# Patient Record
Sex: FEMALE | Race: WHITE | Employment: OTHER | URBAN - METROPOLITAN AREA
[De-identification: names, ages, dates, MRNs, and addresses within clinical notes are randomized per-mention and may not be internally consistent; named-entity substitution may affect disease eponyms.]

---

## 2017-11-28 ENCOUNTER — TRANSFERRED RECORDS (OUTPATIENT)
Dept: HEALTH INFORMATION MANAGEMENT | Facility: CLINIC | Age: 47
End: 2017-11-28

## 2018-07-18 LAB
HPV ABSTRACT: NORMAL
PAP-ABSTRACT: NORMAL

## 2018-07-23 ENCOUNTER — TRANSFERRED RECORDS (OUTPATIENT)
Dept: HEALTH INFORMATION MANAGEMENT | Facility: CLINIC | Age: 48
End: 2018-07-23

## 2018-07-23 LAB — HBA1C MFR BLD: 5.3 % (ref 4.2–5.6)

## 2018-07-25 ENCOUNTER — TRANSFERRED RECORDS (OUTPATIENT)
Dept: HEALTH INFORMATION MANAGEMENT | Facility: CLINIC | Age: 48
End: 2018-07-25

## 2018-09-10 ENCOUNTER — OFFICE VISIT (OUTPATIENT)
Dept: OBGYN | Facility: CLINIC | Age: 48
End: 2018-09-10
Payer: COMMERCIAL

## 2018-09-10 VITALS
HEIGHT: 69 IN | BODY MASS INDEX: 27.4 KG/M2 | HEART RATE: 70 BPM | WEIGHT: 185 LBS | DIASTOLIC BLOOD PRESSURE: 56 MMHG | SYSTOLIC BLOOD PRESSURE: 104 MMHG

## 2018-09-10 DIAGNOSIS — N81.11 CYSTOCELE, MIDLINE: ICD-10-CM

## 2018-09-10 DIAGNOSIS — N80.129 ENDOMETRIOMA OF OVARY: ICD-10-CM

## 2018-09-10 DIAGNOSIS — D21.9 MYOMA: Primary | ICD-10-CM

## 2018-09-10 PROCEDURE — 99204 OFFICE O/P NEW MOD 45 MIN: CPT | Performed by: OBSTETRICS & GYNECOLOGY

## 2018-09-10 ASSESSMENT — ANXIETY QUESTIONNAIRES
2. NOT BEING ABLE TO STOP OR CONTROL WORRYING: NOT AT ALL
5. BEING SO RESTLESS THAT IT IS HARD TO SIT STILL: NOT AT ALL
IF YOU CHECKED OFF ANY PROBLEMS ON THIS QUESTIONNAIRE, HOW DIFFICULT HAVE THESE PROBLEMS MADE IT FOR YOU TO DO YOUR WORK, TAKE CARE OF THINGS AT HOME, OR GET ALONG WITH OTHER PEOPLE: NOT DIFFICULT AT ALL
7. FEELING AFRAID AS IF SOMETHING AWFUL MIGHT HAPPEN: NOT AT ALL
6. BECOMING EASILY ANNOYED OR IRRITABLE: NOT AT ALL
3. WORRYING TOO MUCH ABOUT DIFFERENT THINGS: NOT AT ALL
1. FEELING NERVOUS, ANXIOUS, OR ON EDGE: NOT AT ALL
GAD7 TOTAL SCORE: 0

## 2018-09-10 ASSESSMENT — PATIENT HEALTH QUESTIONNAIRE - PHQ9: 5. POOR APPETITE OR OVEREATING: NOT AT ALL

## 2018-09-10 NOTE — PROGRESS NOTES
"    SUBJECTIVE:                                                   Hailey Garibay is a 48 year old female who presents to clinic today for the following health issue(s):  Patient presents with:  Consult: Discuss fibroid cyst found at outside clinic[facility]      HPI: The patient is a 48-year-old  3 para 3 with 3 normal vaginal deliveries.  She has recently relocated from [place].  Prior to her departure she was seen for her annual exam and had an enlarged uterus.  Both an ultrasound and MRI showed multiple myomas.  Those records are not available yet.      Patient's last menstrual period was 04/15/2018 (approximate)..   Patient is sexually active, No obstetric history on file..  Using none for contraception.    reports that she has never smoked. She has never used smokeless tobacco.    STD testing offered?  Declined    Health maintenance updated:  yes    Today's PHQ-9 Score:   PHQ-9 SCORE 9/10/2018   Total Score 0     Today's ZUHAIR-7 Score:   ZUHAIR-7 SCORE 9/10/2018   Total Score 0       Problem list and histories reviewed & adjusted, as indicated.  Additional history: as documented.    There is no problem list on file for this patient.    Past Surgical History:   Procedure Laterality Date     MAMMOPLASTY AUGMENTATION BILATERAL        Social History   Substance Use Topics     Smoking status: Never Smoker     Smokeless tobacco: Never Used     Alcohol use Yes         Negative family history of: Family History Negative            No current outpatient prescriptions on file.     No current facility-administered medications for this visit.      Not on File    ROS:  12 point review of systems negative other than symptoms noted below.  Genitourinary: Painful [place], Pelvic Pain and Urgency    OBJECTIVE:     /56  Pulse 70  Ht 5' 9\" (1.753 m)  Wt 185 lb (83.9 kg)  LMP 04/15/2018 (Approximate)  BMI 27.32 kg/m2  Body mass index is 27.32 kg/(m^2).    Exam:  Constitutional:  Appearance: Well " nourished, well developed alert, in no acute distress  Neck:  Lymph Nodes:  No lymphadenopathy present; Thyroid:  Gland size normal, nontender, no nodules or masses present on palpation  Chest:  Respiratory Effort:  Breathing unlabored  Cardiovascular: Heart: Auscultation:  Regular rate, normal rhythm, no murmurs present  Gastrointestinal:  Abdominal Examination:  Abdomen nontender to palpation, tone normal without rigidity or guarding, no masses present, umbilicus without lesions; Liver/Spleen:  No hepatomegaly present, liver nontender to palpation; Hernias:  No hernias present  Lymphatic: Lymph Nodes:  No other lymphadenopathy present  Skin:General Inspection:  No rashes present, no lesions present, no areas of discoloration; Genitalia and Groin:  No rashes present, no lesions present, no areas of discoloration, no masses present.  Neurologic/Psychiatric:  Mental Status:  Oriented X3   Pelvic Exam:  External Genitalia:     Normal appearance for age, no discharge present, no tenderness present, no inflammatory lesions present, color normal  Vagina:     Normal vaginal vault without central or paravaginal defects, no discharge present, no inflammatory lesions present, no masses present  Bladder:     Nontender to palpation  Urethra:   Urethral Body:  Urethra palpation normal, urethra structural support normal   Urethral Meatus:  No erythema or lesions present  Cervix:     Appearance healthy, no lesions present, nontender to palpation, no bleeding present  Uterus: Enlarged multi-bosselated uterus with multiple fibroids that are tender.  The uterus is approximately 15 weeks size.  There is no adnexal enlargement or tenderness.     Adnexa:     No adnexal tenderness present, no adnexal masses present  Perineum:     Perineum within normal limits, no evidence of trauma, no rashes or skin lesions present  Anus:     Anus within normal limits, no hemorrhoids present  Inguinal Lymph Nodes:     No lymphadenopathy present  Pubic  Hair:     Normal pubic hair distribution for age  Genitalia and Groin:     No rashes present, no lesions present, no areas of discoloration, no masses present       In-Clinic Test Results: Outside ultrasound shows at least 10 fibroids present.  They are in multiple locations being subserosal intramural and submucosal.  MRI confirms multiple fibroids and notes a 3 cm right ovarian endometrioma.      ASSESSMENT/PLAN:                                                        48-year-old patient new to the office with pelvic pressure pelvic pain and an enlarged myomatous uterus.  She does report genuine urinary stress incontinence and has a small cystocele.  We had a full discussion of the etiology.  She also appears to have a 3.4 cm right ovarian endometrioma.  We have recommended a laparoscopy for evaluation of endometriosis with possible removal of the right tube and ovary.  We have presented a supracervical hysterectomy with retention of the cervix and left ovary if possible.  We will evaluate her further for an anterior repair.  The risks and complications of the procedure have been reviewed and accepted.  The patient will proceed with this at her own disposition.        Yadiel Baer MD  [facility] [place]

## 2018-09-10 NOTE — MR AVS SNAPSHOT
"              After Visit Summary   9/10/2018    Hailey Garibay    MRN: [id number]           Patient Information     Date Of Birth          1970        Visit Information        Provider Department      9/10/2018 9:45 AM Yadiel Baer MD [facility]        Today's Diagnoses     Myoma    -  1    Endometrioma of ovary        Cystocele, midline           Follow-ups after your visit        Who to contact     If you have questions or need follow up information about today's clinic visit or your schedule please contact [facility] directly at [phone].  Normal or non-critical lab and imaging results will be communicated to you by [employer], letter or phone within 4 business days after the clinic has received the results. If you do not hear from us within 7 days, please contact the clinic through [employer] or phone. If you have a critical or abnormal lab result, we will notify you by phone as soon as possible.  Submit refill requests through [employer] or call your pharmacy and they will forward the refill request to us. Please allow 3 business days for your refill to be completed.          Additional Information About Your Visit        MyChart Information     [employer] lets you send messages to your doctor, view your test results, renew your prescriptions, schedule appointments and more. To sign up, go to [email or web address] . Click on \"Log in\" on the left side of the screen, which will take you to the Welcome page. Then click on \"Sign up Now\" on the right side of the page.     You will be asked to enter the access code listed below, as well as some personal information. Please follow the directions to create your username and password.     Your access code is: [email or web address]  Expires: 2018  9:16 AM     Your access code will  in 90 days. If you need help or a new code, please call your [place] clinic or [phone].        Care EveryWhere ID     This is your Care " "EveryWhere ID. This could be used by other organizations to access your [place] medical records  [id number]        Your Vitals Were     Pulse Height Last Period BMI (Body Mass Index)          70 5' 9\" (1.753 m) 04/15/2018 (Approximate) 27.32 kg/m2         Blood Pressure from Last 3 Encounters:   09/10/18 104/56    Weight from Last 3 Encounters:   09/10/18 185 lb (83.9 kg)              Today, you had the following     No orders found for display       Primary Care Provider Office Phone # Fax #    [facility] [phone]       [facility] [address]  [facility] [address]  [facility] [id number]        Equal Access to Services     ROB MILLER : Hadii aad ku hadasho Sochastityali, waaxda luqadaha, qaybta kaalmada adeegyada, dayday lamb . So [facility] [phone].    ATENCIÓN: Si habla español, tiene a griffin disposición servicios gratuitos de asistencia lingüística. [facility] [phone].    We comply with applicable federal civil rights laws and [place] laws. We do not discriminate on the basis of race, color, national origin, age, disability, sex, sexual orientation, or gender identity.            Thank you!     Thank you for choosing [facility]  for your care. Our goal is always to provide you with excellent care. Hearing back from our patients is one way we can continue to improve our services. Please take a few minutes to complete the written survey that you may receive in the mail after your visit with us. Thank you!             Your Updated Medication List - Protect others around you: Learn how to safely use, store and throw away your medicines at www.disposemymeds.org.      Notice  As of 9/10/2018 10:34 AM    You have not been prescribed any medications.      "

## 2018-09-11 ENCOUNTER — TELEPHONE (OUTPATIENT)
Dept: OBGYN | Facility: CLINIC | Age: 48
End: 2018-09-11

## 2018-09-11 DIAGNOSIS — D21.9 MYOMA: Primary | ICD-10-CM

## 2018-09-11 ASSESSMENT — ANXIETY QUESTIONNAIRES: GAD7 TOTAL SCORE: 0

## 2018-09-11 ASSESSMENT — PATIENT HEALTH QUESTIONNAIRE - PHQ9: SUM OF ALL RESPONSES TO PHQ QUESTIONS 1-9: 0

## 2018-09-11 NOTE — TELEPHONE ENCOUNTER
Type of surgery: LSC C02 LASER LSH POSS RSO POSS ANTERIOR RPR  Location of surgery: [facility]  Date and time of surgery: 9/11/2018 7:20am ARRIVAL 5:30am  Surgeon: Buffy  Pre-Op Appt Date: 10/22/2018  Post-Op Appt Date: TBD   Packet sent out: MAILED 9/11/2018  Pre-cert/Authorization completed:  TBD  Date: 9/11/2018    Nilda Longoria  Surgery Scheduler          Order Questions      Question Answer Comment     Procedure name(s) - multi select Laparoscopy with CO2 laser, laparoscopic assisted supracervical hysterectomy, possible right salpingo-oophorectomy, possible anterior repair      Is this a multi surgeon case? No      Laterality Right      Reason for procedure Multiple myomas, right ovarian endometrioma      Location of Case: [facility]      Surgeon Procedure Time (incision to closure) in minutes (per procedure as applicable) 75      Note:  Surgical Case Time Needed (in minutes)     Patient Class (for admit prior to surgery, specify number of days in comments): Same day (hospital outpatient)      Why can t this outpatient surgery be done at the [facility] or [facility]? -      Anesthesia General      Post-Op Appointment 2 weeks      Vendor Needed? No

## 2018-09-11 NOTE — LETTER
September 21, 2018    Hailey Garibay                                                                                                                     [address]   [place] [address]      Dear Hailey,    We have made effort to obtain an accurate quote from your insurance company based on the information we have on file. Your actual coverage may differ. [facility] can NOT guarantee coverage. We recommend that if you have questions regarding coverage to call your insurance company. Our billing department is happy to assist you with your insurance claim but you are responsible for all services rendered whether or not they are paid by your insurance provider. Again, this is not a guarantee of benefits just a notice of the information they have given to us.       Insurance Co [facility] Phone # [phone]  ID [id number]         Group [id number]  Nildajazzy Bowen on 9/21/2018    Policy Eff Date 2/1/2018 and current Yes  CoInsurance (covered at) 70% after deductible has been met and 100% after MOOP is met  Deductible $3500 met to date $3500   Max Out Of Pocket (MOOP) $9000 met to date $4026.53  CoPay $NA  Prior Auth Required:  Yes:   [place] Phone # [phone], Spoke w/ Jessi date 9/21/2018, Auth # PENDING [id number] FAXED CLINICALS CAN TAKE UP TO 15 BUSINESS DAYS (10/12/2018)  Pre Existing Condition No  Surgeon E Buffy In Network Yes  Hospital [facility] In Network Yes  Referral Needed:  No.  Mailed to Patient Yes                  If No Document why by date                  If Yes                                   Date 9/21/2018      Sincerely,         Nilda Longoria  Surgery Scheduler

## 2018-09-21 NOTE — TELEPHONE ENCOUNTER
Insurance Co [facility] Phone # [phone]  ID [id number]         Group [id number]  Nilda lea Jessi on 9/21/2018    Policy Eff Date 2/1/2018 and current Yes  CoInsurance (covered at) 70% after deductible has been met and 100% after MOOP is met  Deductible $3500 met to date $3500   Max Out Of Pocket (MOOP) $9000 met to date $4026.53  CoPay $NA  Prior Auth Required:  Yes:   [place] Phone # [phone], Spoke w/ Jessi date 9/21/2018, Auth # PENDING [id number] FAXED CLINICALS CAN TAKE UP TO 15 BUSINESS DAYS (10/12/2018)  Pre Existing Condition No  Surgeon E [place] In Network Yes  Hospital [facility] In Network Yes  Referral Needed:  No.  Mailed to Patient Yes                  If No Document why by date                  If Yes                                   Date 9/21/2018      Nilda Longoria  Surgery Scheduler

## 2018-10-22 ENCOUNTER — OFFICE VISIT (OUTPATIENT)
Dept: OBGYN | Facility: CLINIC | Age: 48
End: 2018-10-22
Payer: COMMERCIAL

## 2018-10-22 VITALS
HEIGHT: 69 IN | WEIGHT: 185 LBS | BODY MASS INDEX: 27.4 KG/M2 | SYSTOLIC BLOOD PRESSURE: 110 MMHG | DIASTOLIC BLOOD PRESSURE: 76 MMHG | HEART RATE: 72 BPM

## 2018-10-22 DIAGNOSIS — D21.9 MYOMA: ICD-10-CM

## 2018-10-22 DIAGNOSIS — Z01.812 PRE-OPERATIVE LABORATORY EXAMINATION: Primary | ICD-10-CM

## 2018-10-22 DIAGNOSIS — N39.3 URINARY, INCONTINENCE, STRESS FEMALE: ICD-10-CM

## 2018-10-22 DIAGNOSIS — D21.9 MYOMA: Primary | ICD-10-CM

## 2018-10-22 LAB — HGB BLD-MCNC: 11.7 G/DL (ref 11.7–15.7)

## 2018-10-22 PROCEDURE — 99214 OFFICE O/P EST MOD 30 MIN: CPT | Performed by: OBSTETRICS & GYNECOLOGY

## 2018-10-22 PROCEDURE — 85018 HEMOGLOBIN: CPT | Performed by: OBSTETRICS & GYNECOLOGY

## 2018-10-22 PROCEDURE — 36415 COLL VENOUS BLD VENIPUNCTURE: CPT | Performed by: OBSTETRICS & GYNECOLOGY

## 2018-10-22 RX ORDER — CETIRIZINE HYDROCHLORIDE 10 MG/1
10 TABLET ORAL DAILY PRN
COMMUNITY

## 2018-10-22 NOTE — PROGRESS NOTES
SUBJECTIVE:                                                   Hailey Garibay is a 48 year old female who presents to clinic today for the following health issue(s):  Patient presents with:  Pre-Op Exam: 10/25/18: Laparoscopy with CO2 laser, laparoscopic assisted supracervical hysterectomy, possible right salpingo-oophorectomy, possible anterior repair      HPI: The patient is seen at this time for preoperative clearance for hysterectomy with possible right salpingo-oophorectomy and vaginal repairs.  The patient does report fairly significant stress incontinence and is concerned about laxity of the pelvic floor muscles and vaginal tissue.  She fully understands risks and complications.  She is healthy at this time.    Patient's last menstrual period was 10/10/2018..   Patient is sexually active, No obstetric history on file..  Using none for contraception.    reports that she has never smoked. She has never used smokeless tobacco.    STD testing offered?  Declined    Health maintenance updated:  yes    Today's PHQ-2 Score: No flowsheet data found.  Today's PHQ-9 Score:   PHQ-9 SCORE 9/10/2018   Total Score 0     Today's ZUHAIR-7 Score:   ZUHAIR-7 SCORE 9/10/2018   Total Score 0       Problem list and histories reviewed & adjusted, as indicated.  Additional history: as documented.    There is no problem list on file for this patient.    Past Surgical History:   Procedure Laterality Date     MAMMOPLASTY AUGMENTATION BILATERAL  2002      Social History   Substance Use Topics     Smoking status: Never Smoker     Smokeless tobacco: Never Used     Alcohol use Yes         Negative family history of: Family History Negative            Current Outpatient Prescriptions   Medication Sig     cetirizine (ZYRTEC) 10 MG tablet Take 10 mg by mouth daily as needed for allergies     BIOTIN PO Take 1,000 mcg by mouth three times a week     CHELATED MAGNESIUM PO Take 1 tablet by mouth three times a week     Ferrous Sulfate (IRON) 28 MG TABS  "Take 28 mg by mouth three times a week     Ibuprofen (ADVIL PO) Take 400 mg by mouth daily as needed for moderate pain     Multiple Vitamins-Calcium (ONE-A-DAY WOMENS FORMULA PO) Take 1 tablet by mouth twice a week     Pyridoxine HCl (VITAMIN B6 PO) Take 100 mg by mouth twice a week     VITAMIN D, CHOLECALCIFEROL, PO Take 5,000 Units by mouth three times a week     No current facility-administered medications for this visit.      No Known Allergies    ROS:  12 point review of systems negative other than symptoms noted below.  Genitourinary: Frequency, Irregular Menses and Painful [place]    OBJECTIVE:     /76  Pulse 72  Ht 5' 9\" (1.753 m)  Wt 185 lb (83.9 kg)  LMP 10/10/2018  BMI 27.32 kg/m2  Body mass index is 27.32 kg/(m^2).    Exam:  Constitutional:  Appearance: Well nourished, well developed alert, in no acute distress  Neck:  Lymph Nodes:  No lymphadenopathy present; Thyroid:  Gland size normal, nontender, no nodules or masses present on palpation  Chest:  Respiratory Effort:  Breathing unlabored  Cardiovascular: Heart: Auscultation:  Regular rate, normal rhythm, no murmurs present  Gastrointestinal:  Abdominal Examination:  Abdomen nontender to palpation, tone normal without rigidity or guarding, no masses present, umbilicus without lesions; Liver/Spleen:  No hepatomegaly present, liver nontender to palpation; Hernias:  No hernias present  Lymphatic: Lymph Nodes:  No other lymphadenopathy present  Skin:General Inspection:  No rashes present, no lesions present, no areas of discoloration; Genitalia and Groin:  No rashes present, no lesions present, no areas of discoloration, no masses present.  Neurologic/Psychiatric:  Mental Status:  Oriented X3   No Pelvic Exam performed     In-Clinic Test Results:  Results for orders placed or performed in visit on 10/22/18 (from the past 24 hour(s))   Hemoglobin   Result Value Ref Range    Hemoglobin 11.7 11.7 - 15.7 g/dL       ASSESSMENT/PLAN:                "                                       The patient is cleared for general anesthesia.  She has multiple fibroids throughout the uterus.  We have discussed the approach of laparoscopic evaluation of her right ovary for the endometrioma and removal of the uterus.  She has a cystocele and true urinary stress incontinence.  She has requested evaluation of treatment of the stress incontinence and tightening the vaginal canal from its laxity.  The risks and complications have been reviewed.  This includes general anesthesia blood loss infection injury to bowel bladder and ureters as well as the acceptance of morcellation for laparoscopic assisted supracervical hysterectomy.  She understands that the risk of sarcoma is very small but not 0.          Yadiel Baer MD  [facility] [place]

## 2018-10-22 NOTE — H&P (VIEW-ONLY)
SUBJECTIVE:                                                   Hailey Garibay is a 48 year old female who presents to clinic today for the following health issue(s):  Patient presents with:  Pre-Op Exam: 10/25/18: Laparoscopy with CO2 laser, laparoscopic assisted supracervical hysterectomy, possible right salpingo-oophorectomy, possible anterior repair      HPI: The patient is seen at this time for preoperative clearance for hysterectomy with possible right salpingo-oophorectomy and vaginal repairs.  The patient does report fairly significant stress incontinence and is concerned about laxity of the pelvic floor muscles and vaginal tissue.  She fully understands risks and complications.  She is healthy at this time.    Patient's last menstrual period was 10/10/2018..   Patient is sexually active, No obstetric history on file..  Using none for contraception.    reports that she has never smoked. She has never used smokeless tobacco.    STD testing offered?  Declined    Health maintenance updated:  yes    Today's PHQ-2 Score: No flowsheet data found.  Today's PHQ-9 Score:   PHQ-9 SCORE 9/10/2018   Total Score 0     Today's ZUHAIR-7 Score:   ZUHAIR-7 SCORE 9/10/2018   Total Score 0       Problem list and histories reviewed & adjusted, as indicated.  Additional history: as documented.    There is no problem list on file for this patient.    Past Surgical History:   Procedure Laterality Date     MAMMOPLASTY AUGMENTATION BILATERAL  2002      Social History   Substance Use Topics     Smoking status: Never Smoker     Smokeless tobacco: Never Used     Alcohol use Yes         Negative family history of: Family History Negative            Current Outpatient Prescriptions   Medication Sig     cetirizine (ZYRTEC) 10 MG tablet Take 10 mg by mouth daily as needed for allergies     BIOTIN PO Take 1,000 mcg by mouth three times a week     CHELATED MAGNESIUM PO Take 1 tablet by mouth three times a week     Ferrous Sulfate (IRON) 28 MG TABS  "Take 28 mg by mouth three times a week     Ibuprofen (ADVIL PO) Take 400 mg by mouth daily as needed for moderate pain     Multiple Vitamins-Calcium (ONE-A-DAY WOMENS FORMULA PO) Take 1 tablet by mouth twice a week     Pyridoxine HCl (VITAMIN B6 PO) Take 100 mg by mouth twice a week     VITAMIN D, CHOLECALCIFEROL, PO Take 5,000 Units by mouth three times a week     No current facility-administered medications for this visit.      No Known Allergies    ROS:  12 point review of systems negative other than symptoms noted below.  Genitourinary: Frequency, Irregular Menses and Painful [place]    OBJECTIVE:     /76  Pulse 72  Ht 5' 9\" (1.753 m)  Wt 185 lb (83.9 kg)  LMP 10/10/2018  BMI 27.32 kg/m2  Body mass index is 27.32 kg/(m^2).    Exam:  Constitutional:  Appearance: Well nourished, well developed alert, in no acute distress  Neck:  Lymph Nodes:  No lymphadenopathy present; Thyroid:  Gland size normal, nontender, no nodules or masses present on palpation  Chest:  Respiratory Effort:  Breathing unlabored  Cardiovascular: Heart: Auscultation:  Regular rate, normal rhythm, no murmurs present  Gastrointestinal:  Abdominal Examination:  Abdomen nontender to palpation, tone normal without rigidity or guarding, no masses present, umbilicus without lesions; Liver/Spleen:  No hepatomegaly present, liver nontender to palpation; Hernias:  No hernias present  Lymphatic: Lymph Nodes:  No other lymphadenopathy present  Skin:General Inspection:  No rashes present, no lesions present, no areas of discoloration; Genitalia and Groin:  No rashes present, no lesions present, no areas of discoloration, no masses present.  Neurologic/Psychiatric:  Mental Status:  Oriented X3   No Pelvic Exam performed     In-Clinic Test Results:  Results for orders placed or performed in visit on 10/22/18 (from the past 24 hour(s))   Hemoglobin   Result Value Ref Range    Hemoglobin 11.7 11.7 - 15.7 g/dL       ASSESSMENT/PLAN:                "                                       The patient is cleared for general anesthesia.  She has multiple fibroids throughout the uterus.  We have discussed the approach of laparoscopic evaluation of her right ovary for the endometrioma and removal of the uterus.  She has a cystocele and true urinary stress incontinence.  She has requested evaluation of treatment of the stress incontinence and tightening the vaginal canal from its laxity.  The risks and complications have been reviewed.  This includes general anesthesia blood loss infection injury to bowel bladder and ureters as well as the acceptance of morcellation for laparoscopic assisted supracervical hysterectomy.  She understands that the risk of sarcoma is very small but not 0.          Yadiel Baer MD  [facility] [place]

## 2018-10-22 NOTE — MR AVS SNAPSHOT
After Visit Summary   10/22/2018    Hailey Garibay    MRN: [id number]           Patient Information     Date Of Birth          1970        Visit Information        Provider Department      10/22/2018 10:45 AM Yadiel Baer MD [facility] [place]        Today's Diagnoses     Myoma    -  1    Urinary, incontinence, stress female           Follow-ups after your visit        Your next 10 appointments already scheduled     Oct 25, 2018   Procedure with Yadiel Baer MD   [facility] (--)    [address]  [place] [id number] [phone]              Who to contact     If you have questions or need follow up information about today's clinic visit or your schedule please contact [facility] directly at [phone].  Normal or non-critical lab and imaging results will be communicated to you by Qubulushart, letter or phone within 4 business days after the clinic has received the results. If you do not hear from us within 7 days, please contact the clinic through Qubulushart or phone. If you have a critical or abnormal lab result, we will notify you by phone as soon as possible.  Submit refill requests through Dialogic or call your pharmacy and they will forward the refill request to us. Please allow 3 business days for your refill to be completed.          Additional Information About Your Visit        MyChart Information     Dialogic gives you secure access to your electronic health record. If you see a primary care provider, you can also send messages to your care team and make appointments. If you have questions, please call your primary care clinic.  If you do not have a primary care provider, please call [phone] and they will assist you.        Care EveryWhere ID     This is your Care EveryWhere ID. This could be used by other organizations to access your [place] medical records  [id number]        Your Vitals Were     Pulse  "Height Last Period BMI (Body Mass Index)          72 5' 9\" (1.753 m) 10/10/2018 27.32 kg/m2         Blood Pressure from Last 3 Encounters:   10/22/18 110/76   09/10/18 104/56    Weight from Last 3 Encounters:   10/22/18 185 lb (83.9 kg)   09/10/18 185 lb (83.9 kg)              Today, you had the following     No orders found for display       Primary Care Provider Office Phone # Fax #    [facility] [place] [facility] [phone]       [facility] [address] [facility] [address]  [facility] [address]        Equal Access to Services     ROB MILLER : Hadii boogie Maguire, wajazz dodge, qachristiane kaalmada troy, dayday avilez. So [facility] [phone].    ATENCIÓN: Si habla español, tiene a griffin disposición servicios gratuitos de asistencia lingüística. Llame al [phone].    We comply with applicable federal civil rights laws and [place] laws. We do not discriminate on the basis of race, color, national origin, age, disability, sex, sexual orientation, or gender identity.            Thank you!     Thank you for choosing [facility] [place]  for your care. Our goal is always to provide you with excellent care. Hearing back from our patients is one way we can continue to improve our services. Please take a few minutes to complete the written survey that you may receive in the mail after your visit with us. Thank you!             Your Updated Medication List - Protect others around you: Learn how to safely use, store and throw away your medicines at www.disposemymeds.org.          This list is accurate as of 10/22/18 11:08 AM.  Always use your most recent med list.                   Brand Name Dispense Instructions for use Diagnosis    ADVIL PO      Take 400 mg by mouth daily as needed for moderate pain        BIOTIN PO      Take 1,000 mcg by mouth three times a week        cetirizine 10 MG tablet    zyrTEC     Take 10 mg by " mouth daily as needed for allergies        CHELATED MAGNESIUM PO      Take 1 tablet by mouth three times a week        Iron 28 MG Tabs      Take 28 mg by mouth three times a week        ONE-A-DAY WOMENS FORMULA PO      Take 1 tablet by mouth twice a week        VITAMIN B6 PO      Take 100 mg by mouth twice a week        VITAMIN D (CHOLECALCIFEROL) PO      Take 5,000 Units by mouth three times a week

## 2018-10-22 NOTE — TELEPHONE ENCOUNTER
PA approved [id number]   Paperwork from [facility] sent to [facility] as a STAT SCAN    Nilda Longoria  Surgery Scheduler

## 2018-10-25 ENCOUNTER — ANESTHESIA (OUTPATIENT)
Dept: SURGERY | Facility: CLINIC | Age: 48
End: 2018-10-25
Payer: COMMERCIAL

## 2018-10-25 ENCOUNTER — HOSPITAL ENCOUNTER (OUTPATIENT)
Facility: CLINIC | Age: 48
Discharge: HOME | End: 2018-10-26
Attending: OBSTETRICS & GYNECOLOGY | Admitting: OBSTETRICS & GYNECOLOGY
Payer: COMMERCIAL

## 2018-10-25 ENCOUNTER — SURGERY (OUTPATIENT)
Age: 48
End: 2018-10-25
Payer: COMMERCIAL

## 2018-10-25 ENCOUNTER — ANESTHESIA EVENT (OUTPATIENT)
Dept: SURGERY | Facility: CLINIC | Age: 48
End: 2018-10-25
Payer: COMMERCIAL

## 2018-10-25 DIAGNOSIS — D21.9 MYOMA: Primary | ICD-10-CM

## 2018-10-25 LAB — B-HCG SERPL-ACNC: <1 IU/L (ref 0–5)

## 2018-10-25 PROCEDURE — 57240 ANTERIOR COLPORRHAPHY: CPT | Mod: 51 | Performed by: OBSTETRICS & GYNECOLOGY

## 2018-10-25 PROCEDURE — 40000936 ZZH STATISTIC OUTPATIENT (NON-OBS) NIGHT

## 2018-10-25 PROCEDURE — 88307 TISSUE EXAM BY PATHOLOGIST: CPT | Performed by: OBSTETRICS & GYNECOLOGY

## 2018-10-25 PROCEDURE — 36000063 ZZH SURGERY LEVEL 4 EA 15 ADDTL MIN: Performed by: OBSTETRICS & GYNECOLOGY

## 2018-10-25 PROCEDURE — 25000132 ZZH RX MED GY IP 250 OP 250 PS 637: Performed by: ANESTHESIOLOGY

## 2018-10-25 PROCEDURE — 25000128 H RX IP 250 OP 636: Performed by: NURSE ANESTHETIST, CERTIFIED REGISTERED

## 2018-10-25 PROCEDURE — 25000128 H RX IP 250 OP 636: Performed by: OBSTETRICS & GYNECOLOGY

## 2018-10-25 PROCEDURE — 40000170 ZZH STATISTIC PRE-PROCEDURE ASSESSMENT II: Performed by: OBSTETRICS & GYNECOLOGY

## 2018-10-25 PROCEDURE — 37000009 ZZH ANESTHESIA TECHNICAL FEE, EACH ADDTL 15 MIN: Performed by: OBSTETRICS & GYNECOLOGY

## 2018-10-25 PROCEDURE — 40000935 ZZH STATISTIC OUTPATIENT (NON-OBS) EVE

## 2018-10-25 PROCEDURE — 25000125 ZZHC RX 250: Performed by: ANESTHESIOLOGY

## 2018-10-25 PROCEDURE — 84702 CHORIONIC GONADOTROPIN TEST: CPT | Performed by: OBSTETRICS & GYNECOLOGY

## 2018-10-25 PROCEDURE — 37000008 ZZH ANESTHESIA TECHNICAL FEE, 1ST 30 MIN: Performed by: OBSTETRICS & GYNECOLOGY

## 2018-10-25 PROCEDURE — 40000934 ZZH STATISTIC OUTPATIENT (NON-OBS) DAY

## 2018-10-25 PROCEDURE — 25000132 ZZH RX MED GY IP 250 OP 250 PS 637: Performed by: OBSTETRICS & GYNECOLOGY

## 2018-10-25 PROCEDURE — 36000093 ZZH SURGERY LEVEL 4 1ST 30 MIN: Performed by: OBSTETRICS & GYNECOLOGY

## 2018-10-25 PROCEDURE — 25000125 ZZHC RX 250: Performed by: NURSE ANESTHETIST, CERTIFIED REGISTERED

## 2018-10-25 PROCEDURE — 25000125 ZZHC RX 250: Performed by: OBSTETRICS & GYNECOLOGY

## 2018-10-25 PROCEDURE — 88307 TISSUE EXAM BY PATHOLOGIST: CPT | Mod: 26 | Performed by: OBSTETRICS & GYNECOLOGY

## 2018-10-25 PROCEDURE — 25000566 ZZH SEVOFLURANE, EA 15 MIN: Performed by: OBSTETRICS & GYNECOLOGY

## 2018-10-25 PROCEDURE — 27210794 ZZH OR GENERAL SUPPLY STERILE: Performed by: OBSTETRICS & GYNECOLOGY

## 2018-10-25 PROCEDURE — 25800025 ZZH RX 258: Performed by: OBSTETRICS & GYNECOLOGY

## 2018-10-25 PROCEDURE — 71000012 ZZH RECOVERY PHASE 1 LEVEL 1 FIRST HR: Performed by: OBSTETRICS & GYNECOLOGY

## 2018-10-25 PROCEDURE — 58542 LSH W/T/O UT 250 G OR LESS: CPT | Performed by: OBSTETRICS & GYNECOLOGY

## 2018-10-25 PROCEDURE — 25000128 H RX IP 250 OP 636: Performed by: ANESTHESIOLOGY

## 2018-10-25 PROCEDURE — 58662 LAPAROSCOPY EXCISE LESIONS: CPT | Mod: 51 | Performed by: OBSTETRICS & GYNECOLOGY

## 2018-10-25 PROCEDURE — 36415 COLL VENOUS BLD VENIPUNCTURE: CPT | Performed by: OBSTETRICS & GYNECOLOGY

## 2018-10-25 RX ORDER — ACETAMINOPHEN 500 MG
1000 TABLET ORAL ONCE
Status: COMPLETED | OUTPATIENT
Start: 2018-10-25 | End: 2018-10-25

## 2018-10-25 RX ORDER — FENTANYL CITRATE 50 UG/ML
25-50 INJECTION, SOLUTION INTRAMUSCULAR; INTRAVENOUS
Status: DISCONTINUED | OUTPATIENT
Start: 2018-10-25 | End: 2018-10-25 | Stop reason: HOSPADM

## 2018-10-25 RX ORDER — ONDANSETRON 2 MG/ML
4 INJECTION INTRAMUSCULAR; INTRAVENOUS EVERY 30 MIN PRN
Status: DISCONTINUED | OUTPATIENT
Start: 2018-10-25 | End: 2018-10-25 | Stop reason: HOSPADM

## 2018-10-25 RX ORDER — LIDOCAINE HYDROCHLORIDE AND EPINEPHRINE 10; 10 MG/ML; UG/ML
INJECTION, SOLUTION INFILTRATION; PERINEURAL
Status: DISCONTINUED
Start: 2018-10-25 | End: 2018-10-25 | Stop reason: HOSPADM

## 2018-10-25 RX ORDER — ACETAMINOPHEN 325 MG/1
650 TABLET ORAL EVERY 6 HOURS PRN
Status: DISCONTINUED | OUTPATIENT
Start: 2018-10-25 | End: 2018-10-26 | Stop reason: HOSPADM

## 2018-10-25 RX ORDER — ONDANSETRON 2 MG/ML
4 INJECTION INTRAMUSCULAR; INTRAVENOUS EVERY 6 HOURS PRN
Status: DISCONTINUED | OUTPATIENT
Start: 2018-10-25 | End: 2018-10-26 | Stop reason: HOSPADM

## 2018-10-25 RX ORDER — OXYCODONE AND ACETAMINOPHEN 5; 325 MG/1; MG/1
1-2 TABLET ORAL EVERY 4 HOURS PRN
Status: DISCONTINUED | OUTPATIENT
Start: 2018-10-25 | End: 2018-10-26 | Stop reason: HOSPADM

## 2018-10-25 RX ORDER — CEFAZOLIN SODIUM 2 G/100ML
2 INJECTION, SOLUTION INTRAVENOUS
Status: COMPLETED | OUTPATIENT
Start: 2018-10-25 | End: 2018-10-25

## 2018-10-25 RX ORDER — KETOROLAC TROMETHAMINE 30 MG/ML
INJECTION, SOLUTION INTRAMUSCULAR; INTRAVENOUS PRN
Status: DISCONTINUED | OUTPATIENT
Start: 2018-10-25 | End: 2018-10-25

## 2018-10-25 RX ORDER — MEPERIDINE HYDROCHLORIDE 25 MG/ML
12.5 INJECTION INTRAMUSCULAR; INTRAVENOUS; SUBCUTANEOUS
Status: DISCONTINUED | OUTPATIENT
Start: 2018-10-25 | End: 2018-10-25 | Stop reason: HOSPADM

## 2018-10-25 RX ORDER — SODIUM CHLORIDE, SODIUM LACTATE, POTASSIUM CHLORIDE, CALCIUM CHLORIDE 600; 310; 30; 20 MG/100ML; MG/100ML; MG/100ML; MG/100ML
INJECTION, SOLUTION INTRAVENOUS CONTINUOUS PRN
Status: DISCONTINUED | OUTPATIENT
Start: 2018-10-25 | End: 2018-10-25

## 2018-10-25 RX ORDER — ACETAMINOPHEN 325 MG/1
975 TABLET ORAL ONCE
Status: DISCONTINUED | OUTPATIENT
Start: 2018-10-25 | End: 2018-10-25

## 2018-10-25 RX ORDER — HYDROXYZINE HYDROCHLORIDE 50 MG/1
50 TABLET, FILM COATED ORAL ONCE
Status: COMPLETED | OUTPATIENT
Start: 2018-10-25 | End: 2018-10-25

## 2018-10-25 RX ORDER — BUPIVACAINE HYDROCHLORIDE 2.5 MG/ML
INJECTION, SOLUTION INFILTRATION; PERINEURAL PRN
Status: DISCONTINUED | OUTPATIENT
Start: 2018-10-25 | End: 2018-10-25 | Stop reason: HOSPADM

## 2018-10-25 RX ORDER — SCOLOPAMINE TRANSDERMAL SYSTEM 1 MG/1
1 PATCH, EXTENDED RELEASE TRANSDERMAL ONCE
Status: COMPLETED | OUTPATIENT
Start: 2018-10-25 | End: 2018-10-25

## 2018-10-25 RX ORDER — NALOXONE HYDROCHLORIDE 0.4 MG/ML
.1-.4 INJECTION, SOLUTION INTRAMUSCULAR; INTRAVENOUS; SUBCUTANEOUS
Status: DISCONTINUED | OUTPATIENT
Start: 2018-10-25 | End: 2018-10-25 | Stop reason: HOSPADM

## 2018-10-25 RX ORDER — ONDANSETRON 4 MG/1
4 TABLET, ORALLY DISINTEGRATING ORAL EVERY 6 HOURS PRN
Status: DISCONTINUED | OUTPATIENT
Start: 2018-10-25 | End: 2018-10-26 | Stop reason: HOSPADM

## 2018-10-25 RX ORDER — PROPOFOL 10 MG/ML
INJECTION, EMULSION INTRAVENOUS CONTINUOUS PRN
Status: DISCONTINUED | OUTPATIENT
Start: 2018-10-25 | End: 2018-10-25

## 2018-10-25 RX ORDER — KETOROLAC TROMETHAMINE 30 MG/ML
30 INJECTION, SOLUTION INTRAMUSCULAR; INTRAVENOUS EVERY 6 HOURS PRN
Status: DISCONTINUED | OUTPATIENT
Start: 2018-10-25 | End: 2018-10-26 | Stop reason: HOSPADM

## 2018-10-25 RX ORDER — PROCHLORPERAZINE MALEATE 10 MG
10 TABLET ORAL EVERY 6 HOURS PRN
Status: DISCONTINUED | OUTPATIENT
Start: 2018-10-25 | End: 2018-10-26 | Stop reason: HOSPADM

## 2018-10-25 RX ORDER — MAGNESIUM HYDROXIDE 1200 MG/15ML
LIQUID ORAL PRN
Status: DISCONTINUED | OUTPATIENT
Start: 2018-10-25 | End: 2018-10-25 | Stop reason: HOSPADM

## 2018-10-25 RX ORDER — LIDOCAINE HYDROCHLORIDE AND EPINEPHRINE 10; 10 MG/ML; UG/ML
INJECTION, SOLUTION INFILTRATION; PERINEURAL PRN
Status: DISCONTINUED | OUTPATIENT
Start: 2018-10-25 | End: 2018-10-25 | Stop reason: HOSPADM

## 2018-10-25 RX ORDER — HEPARIN SODIUM 1000 [USP'U]/ML
INJECTION, SOLUTION INTRAVENOUS; SUBCUTANEOUS
Status: DISCONTINUED
Start: 2018-10-25 | End: 2018-10-25 | Stop reason: HOSPADM

## 2018-10-25 RX ORDER — CEFAZOLIN SODIUM 1 G/3ML
1 INJECTION, POWDER, FOR SOLUTION INTRAMUSCULAR; INTRAVENOUS SEE ADMIN INSTRUCTIONS
Status: DISCONTINUED | OUTPATIENT
Start: 2018-10-25 | End: 2018-10-25 | Stop reason: HOSPADM

## 2018-10-25 RX ORDER — LIDOCAINE 40 MG/G
CREAM TOPICAL
Status: DISCONTINUED | OUTPATIENT
Start: 2018-10-25 | End: 2018-10-26 | Stop reason: HOSPADM

## 2018-10-25 RX ORDER — CEFAZOLIN SODIUM 2 G/100ML
2 INJECTION, SOLUTION INTRAVENOUS EVERY 8 HOURS
Status: COMPLETED | OUTPATIENT
Start: 2018-10-25 | End: 2018-10-26

## 2018-10-25 RX ORDER — GLYCOPYRROLATE 0.2 MG/ML
INJECTION, SOLUTION INTRAMUSCULAR; INTRAVENOUS PRN
Status: DISCONTINUED | OUTPATIENT
Start: 2018-10-25 | End: 2018-10-25

## 2018-10-25 RX ORDER — SODIUM CHLORIDE, SODIUM LACTATE, POTASSIUM CHLORIDE, CALCIUM CHLORIDE 600; 310; 30; 20 MG/100ML; MG/100ML; MG/100ML; MG/100ML
INJECTION, SOLUTION INTRAVENOUS CONTINUOUS
Status: DISCONTINUED | OUTPATIENT
Start: 2018-10-25 | End: 2018-10-25 | Stop reason: HOSPADM

## 2018-10-25 RX ORDER — LIDOCAINE HYDROCHLORIDE 20 MG/ML
INJECTION, SOLUTION INFILTRATION; PERINEURAL PRN
Status: DISCONTINUED | OUTPATIENT
Start: 2018-10-25 | End: 2018-10-25

## 2018-10-25 RX ORDER — OXYCODONE AND ACETAMINOPHEN 5; 325 MG/1; MG/1
1-2 TABLET ORAL EVERY 4 HOURS PRN
Qty: 36 TABLET | Refills: 0 | Status: SHIPPED | OUTPATIENT
Start: 2018-10-25 | End: 2019-01-23

## 2018-10-25 RX ORDER — HYDROMORPHONE HYDROCHLORIDE 1 MG/ML
0.2 INJECTION, SOLUTION INTRAMUSCULAR; INTRAVENOUS; SUBCUTANEOUS
Status: DISCONTINUED | OUTPATIENT
Start: 2018-10-25 | End: 2018-10-26 | Stop reason: HOSPADM

## 2018-10-25 RX ORDER — PROPOFOL 10 MG/ML
INJECTION, EMULSION INTRAVENOUS PRN
Status: DISCONTINUED | OUTPATIENT
Start: 2018-10-25 | End: 2018-10-25

## 2018-10-25 RX ORDER — HYDRALAZINE HYDROCHLORIDE 20 MG/ML
2.5-5 INJECTION INTRAMUSCULAR; INTRAVENOUS EVERY 10 MIN PRN
Status: DISCONTINUED | OUTPATIENT
Start: 2018-10-25 | End: 2018-10-25 | Stop reason: HOSPADM

## 2018-10-25 RX ORDER — FENTANYL CITRATE 50 UG/ML
INJECTION, SOLUTION INTRAMUSCULAR; INTRAVENOUS PRN
Status: DISCONTINUED | OUTPATIENT
Start: 2018-10-25 | End: 2018-10-25

## 2018-10-25 RX ORDER — BUPIVACAINE HYDROCHLORIDE 2.5 MG/ML
INJECTION, SOLUTION EPIDURAL; INFILTRATION; INTRACAUDAL
Status: DISCONTINUED
Start: 2018-10-25 | End: 2018-10-25 | Stop reason: HOSPADM

## 2018-10-25 RX ORDER — NEOSTIGMINE METHYLSULFATE 1 MG/ML
VIAL (ML) INJECTION PRN
Status: DISCONTINUED | OUTPATIENT
Start: 2018-10-25 | End: 2018-10-25

## 2018-10-25 RX ORDER — HYDROMORPHONE HYDROCHLORIDE 1 MG/ML
.3-.5 INJECTION, SOLUTION INTRAMUSCULAR; INTRAVENOUS; SUBCUTANEOUS EVERY 10 MIN PRN
Status: DISCONTINUED | OUTPATIENT
Start: 2018-10-25 | End: 2018-10-25 | Stop reason: HOSPADM

## 2018-10-25 RX ORDER — ONDANSETRON 4 MG/1
4 TABLET, ORALLY DISINTEGRATING ORAL EVERY 30 MIN PRN
Status: DISCONTINUED | OUTPATIENT
Start: 2018-10-25 | End: 2018-10-25 | Stop reason: HOSPADM

## 2018-10-25 RX ORDER — ALBUTEROL SULFATE 0.83 MG/ML
2.5 SOLUTION RESPIRATORY (INHALATION) EVERY 4 HOURS PRN
Status: DISCONTINUED | OUTPATIENT
Start: 2018-10-25 | End: 2018-10-25 | Stop reason: HOSPADM

## 2018-10-25 RX ORDER — ONDANSETRON 2 MG/ML
INJECTION INTRAMUSCULAR; INTRAVENOUS PRN
Status: DISCONTINUED | OUTPATIENT
Start: 2018-10-25 | End: 2018-10-25

## 2018-10-25 RX ORDER — NALOXONE HYDROCHLORIDE 0.4 MG/ML
.1-.4 INJECTION, SOLUTION INTRAMUSCULAR; INTRAVENOUS; SUBCUTANEOUS
Status: DISCONTINUED | OUTPATIENT
Start: 2018-10-25 | End: 2018-10-26 | Stop reason: HOSPADM

## 2018-10-25 RX ADMIN — PROPOFOL 200 MCG/KG/MIN: 10 INJECTION, EMULSION INTRAVENOUS at 07:26

## 2018-10-25 RX ADMIN — CEFAZOLIN SODIUM 2 G: 2 INJECTION, SOLUTION INTRAVENOUS at 07:34

## 2018-10-25 RX ADMIN — KETOROLAC TROMETHAMINE 30 MG: 30 INJECTION, SOLUTION INTRAMUSCULAR at 20:43

## 2018-10-25 RX ADMIN — HEPARIN SODIUM 1000 ML: 1000 INJECTION, SOLUTION INTRAVENOUS; SUBCUTANEOUS at 08:01

## 2018-10-25 RX ADMIN — DEXMEDETOMIDINE HYDROCHLORIDE 4 MCG: 100 INJECTION, SOLUTION INTRAVENOUS at 08:35

## 2018-10-25 RX ADMIN — LIDOCAINE HYDROCHLORIDE AND EPINEPHRINE 10 ML: 10; 10 INJECTION, SOLUTION INFILTRATION; PERINEURAL at 08:49

## 2018-10-25 RX ADMIN — ACETAMINOPHEN 1000 MG: 500 TABLET, FILM COATED ORAL at 06:59

## 2018-10-25 RX ADMIN — PROPOFOL 200 MG: 10 INJECTION, EMULSION INTRAVENOUS at 07:22

## 2018-10-25 RX ADMIN — OXYCODONE HYDROCHLORIDE AND ACETAMINOPHEN 1 TABLET: 5; 325 TABLET ORAL at 19:25

## 2018-10-25 RX ADMIN — FENTANYL CITRATE 100 MCG: 50 INJECTION, SOLUTION INTRAMUSCULAR; INTRAVENOUS at 07:22

## 2018-10-25 RX ADMIN — DEXMEDETOMIDINE HYDROCHLORIDE 8 MCG: 100 INJECTION, SOLUTION INTRAVENOUS at 08:24

## 2018-10-25 RX ADMIN — WATER 500 ML: 100 IRRIGANT IRRIGATION at 08:06

## 2018-10-25 RX ADMIN — SODIUM CHLORIDE, POTASSIUM CHLORIDE, SODIUM LACTATE AND CALCIUM CHLORIDE: 600; 310; 30; 20 INJECTION, SOLUTION INTRAVENOUS at 07:21

## 2018-10-25 RX ADMIN — CEFAZOLIN SODIUM 2 G: 2 INJECTION, SOLUTION INTRAVENOUS at 15:39

## 2018-10-25 RX ADMIN — KETOROLAC TROMETHAMINE 30 MG: 30 INJECTION, SOLUTION INTRAMUSCULAR at 08:48

## 2018-10-25 RX ADMIN — NEOSTIGMINE METHYLSULFATE 4 MG: 1 INJECTION, SOLUTION INTRAVENOUS at 08:50

## 2018-10-25 RX ADMIN — DEXMEDETOMIDINE HYDROCHLORIDE 8 MCG: 100 INJECTION, SOLUTION INTRAVENOUS at 07:49

## 2018-10-25 RX ADMIN — SODIUM CHLORIDE, POTASSIUM CHLORIDE, SODIUM LACTATE AND CALCIUM CHLORIDE: 600; 310; 30; 20 INJECTION, SOLUTION INTRAVENOUS at 08:40

## 2018-10-25 RX ADMIN — HYDROXYZINE HYDROCHLORIDE 50 MG: 50 TABLET, FILM COATED ORAL at 06:54

## 2018-10-25 RX ADMIN — LIDOCAINE HYDROCHLORIDE 80 MG: 20 INJECTION, SOLUTION INFILTRATION; PERINEURAL at 07:26

## 2018-10-25 RX ADMIN — GLYCOPYRROLATE 0.4 MG: 0.2 INJECTION, SOLUTION INTRAMUSCULAR; INTRAVENOUS at 08:50

## 2018-10-25 RX ADMIN — SCOPALAMINE 1 PATCH: 1 PATCH, EXTENDED RELEASE TRANSDERMAL at 06:57

## 2018-10-25 RX ADMIN — BUPIVACAINE HYDROCHLORIDE 15 ML: 2.5 INJECTION, SOLUTION EPIDURAL; INFILTRATION; INTRACAUDAL; PERINEURAL at 08:44

## 2018-10-25 RX ADMIN — ROCURONIUM BROMIDE 10 MG: 10 INJECTION INTRAVENOUS at 08:04

## 2018-10-25 RX ADMIN — DEXTROSE AND SODIUM CHLORIDE: 5; 450 INJECTION, SOLUTION INTRAVENOUS at 22:35

## 2018-10-25 RX ADMIN — FENTANYL CITRATE 25 MCG: 50 INJECTION, SOLUTION INTRAMUSCULAR; INTRAVENOUS at 08:47

## 2018-10-25 RX ADMIN — ONDANSETRON 4 MG: 2 INJECTION INTRAMUSCULAR; INTRAVENOUS at 08:31

## 2018-10-25 RX ADMIN — FENTANYL CITRATE 25 MCG: 50 INJECTION, SOLUTION INTRAMUSCULAR; INTRAVENOUS at 08:44

## 2018-10-25 RX ADMIN — SODIUM CHLORIDE 1000 ML: 900 IRRIGANT IRRIGATION at 08:06

## 2018-10-25 RX ADMIN — ROCURONIUM BROMIDE 40 MG: 10 INJECTION INTRAVENOUS at 07:22

## 2018-10-25 RX ADMIN — MEPERIDINE HYDROCHLORIDE 12.5 MG: 25 INJECTION INTRAMUSCULAR; INTRAVENOUS; SUBCUTANEOUS at 09:19

## 2018-10-25 RX ADMIN — MIDAZOLAM 2 MG: 1 INJECTION INTRAMUSCULAR; INTRAVENOUS at 07:21

## 2018-10-25 RX ADMIN — DEXTROSE AND SODIUM CHLORIDE: 5; 450 INJECTION, SOLUTION INTRAVENOUS at 12:08

## 2018-10-25 RX ADMIN — Medication 0.5 MG: at 10:04

## 2018-10-25 NOTE — OP NOTE
Procedure Date: 10/25/2018      DATE OF SURGERY:  10/25/2018.      REASON FOR ADMISSION:  Massive myomatous uterus, pelvic pain, urinary stress incontinence.      OPERATIVE PROCEDURES:  Diagnostic laparoscopy, laser lysis of adhesion, laser vaporization of widespread endometriosis, bilateral salpingectomy, laparoscopic-assisted supracervical hysterectomy for uterus with greater than 10 fibroids, greater than 250 grams, anterior colporrhaphy, cystoscopy.      OPERATIVE FINDINGS:  The patient had widespread endometriosis with bilateral periadnexal adhesions disease and posterior cul-de-sac implants.  The anterior cul-de-sac was clean.  Upper abdomen exploration was negative.  There were multiple fibroids throughout a markedly enlarged uterus.  The patient also has stress incontinence and loss of her angle.  An anterior colporrhaphy was performed with excellent elevation of the angle.  Cystoscopy showed normal bladder, urethra and efflux of urine through both ureters.      OPERATIVE PROCEDURE IN DETAIL:  After general anesthesia was induced, the patient was placed in the dorsal lithotomy position and prepped and draped in the usual fashion.  A Augustin catheter was placed.  A uterine manipulator was placed.      Through a subumbilical incision, the Veress needle was placed and 3 liters of CO2 were insufflated.  The laparoscope, trocar and sheath were placed without incident.  Two 5 mm lower quadrant trocars were placed through Marcaine-injected fields.  The laser scope was brought in, and periadnexal adhesions and surface endometriosis on both ovaries and throughout the entire posterior cul-de-sac on both sidewalls was undertaken.  Underlying structures were normal.  The mesosalpinx on both sides was doubly cauterized and cut.  We went through the round ligament, broad ligament and uterine artery pedicles with double cautery and cutting bilaterally.  At the mid plane of the cervix, the uterus was excised.  The  endocervical canal was cauterized from above.      The left lower quadrant trocar site was expanded up to admit the morcellator.  The entire uterus with greater than 10 fibroids and greater than 250 grams was morcellated out and submitted to the pathologist in toto.  Copious irrigation was undertaken.  Hemostasis was excellent.      The left lower quadrant site was closed at the peritoneum and fascia with 2-0 Vicryl.  The pelvis was reinspected under low pressure and fluid with no sign of bleeding.  The ovaries were well suspended.      The skin incisions from above were closed with 4-0 Vicryl and Steri-Strips.      We went below and identified the moderate cystocele.  The vaginal mucosa was injected in the anterior vaginal wall and incision carried from the posterior urethrovesical angle to the anterior fornix.  The bladder was dissected free.  Cystoscopy was performed at this time to show normal bladder, ureteral and urethral architecture.      Supriya plication stitches of 2-0 chromic were placed to redevelop a posterior urethrovesical angle.  The excess mucosa and fascia were excised.  The defect was closed with interrupted 2-0 Vicryl sutures from anterior to superior position.  Hemostasis was excellent.  At this time, the decision was made to back out.  The estimated blood loss was less than 25 mL.  Augustin catheter remains.  The patient will be observed overnight and discharged when stable, having reestablished bowel and bladder function and having good pain control.         DOTTIE MARIO JR, MD             D: 10/25/2018   T: 10/25/2018   MT: JACOBO      Name:     NERI HARDWICK   MRN:      [id number]        Account:        [id number]   :      1970           Procedure Date: 10/25/2018      Document: [id number]

## 2018-10-25 NOTE — IP AVS SNAPSHOT
MRN:[id number]                      After Visit Summary   10/25/2018    Hailey Garibay    MRN: [id number]           Thank you!     Thank you for choosing [facility] for your care. Our goal is always to provide you with excellent care. Hearing back from our patients is one way we can continue to improve our services. Please take a few minutes to complete the written survey that you may receive in the mail after you visit with us. Thank you!        Patient Information     Date Of Birth          1970        Designated Caregiver       Most Recent Value    Caregiver    Will someone help with your care after discharge? yes    Name of designated caregiver Faheem - spouse    Phone number of caregiver [phone]    Caregiver address [address] ,  [place]  [address]      About your hospital stay     You were admitted on:  October 25, 2018 You last received care in the:  [facility]    You were discharged on:  October 26, 2018       Who to Call     For medical emergencies, please call 911.  For non-urgent questions about your medical care, please call your primary care provider or clinic, [phone]  For questions related to your surgery, please call your surgery clinic        Attending Provider     Provider Specialty    Yadiel Baer MD OB/Gyn       Primary Care Provider Office Phone # Fax #    [facility] [phone]      After Care Instructions     Discharge Instructions       Patient to follow up with appointment in 2 weeks                  Pending Results     Date and Time Order Name Status Description    10/25/2018 0821 Surgical pathology exam In process             Admission Information     Date & Time Provider Department Dept. Phone    10/25/2018 Yadiel Baer MD [facility] [phone]      Your Vitals Were     Blood Pressure Pulse Temperature Respirations Height Weight    128/69 (BP Location: Right arm) 75  "98.7  F (37.1  C) (Oral) 16 1.753 m (5' 9\") 83.6 kg (184 lb 3.2 oz)    Last Period Pulse Oximetry BMI (Body Mass Index)             10/10/2018 98% 27.2 kg/m2         Kowloon[facility]art Information     [facility] gives you secure access to your electronic health record. If you see a primary care provider, you can also send messages to your care team and make appointments. If you have questions, please call your primary care clinic.  If you do not have a primary care provider, please call [phone] and they will assist you.        Care EveryWhere ID     This is your Care EveryWhere ID. This could be used by other organizations to access your [place] medical records  [id number]        Equal Access to Services     ROB MILLER : Sharon Maguire, ivan dodge, amelia simmons, dayday avilez. So [facility] [phone].    ATENCIÓN: Si habla español, tiene a griffin disposición servicios gratuitos de asistencia lingüística. Llame al [phone].    We comply with applicable federal civil rights laws and [place] laws. We do not discriminate on the basis of race, color, national origin, age, disability, sex, sexual orientation, or gender identity.               Review of your medicines      START taking        Dose / Directions    oxyCODONE-acetaminophen 5-325 MG per tablet   Commonly known as:  PERCOCET        Dose:  1-2 tablet   Take 1-2 tablets by mouth every 4 hours as needed for pain (moderate to severe)   Quantity:  36 tablet   Refills:  0         CONTINUE these medicines which have NOT CHANGED        Dose / Directions    ADVIL PO        Dose:  400 mg   Take 400 mg by mouth daily as needed for moderate pain (200mg x 2 = 400mg)   Refills:  0       BIOTIN PO        Dose:  1000 mcg   Take 1,000 mcg by mouth three times a week   Refills:  0       cetirizine 10 MG tablet   Commonly known as:  zyrTEC        Dose:  10 mg   Take 10 mg by mouth daily as needed for allergies   Refills:  0 "       CHELATED MAGNESIUM PO        Dose:  1 tablet   Take 1 tablet by mouth three times a week   Refills:  0       Iron 28 MG Tabs        Dose:  28 mg   Take 28 mg by mouth three times a week   Refills:  0       ONE-A-DAY WOMENS FORMULA PO        Dose:  1 tablet   Take 1 tablet by mouth three times a week   Refills:  0       TYLENOL PO        Dose:  320 mg   Take 320 mg by mouth 2 times daily as needed for mild pain   Refills:  0       VITAMIN B6 PO        Dose:  100 mg   Take 100 mg by mouth three times a week   Refills:  0       VITAMIN D (CHOLECALCIFEROL) PO        Dose:  5000 Units   Take 5,000 Units by mouth three times a week   Refills:  0            Where to get your medicines      Some of these will need a paper prescription and others can be bought over the counter. Ask your nurse if you have questions.     Bring a paper prescription for each of these medications     oxyCODONE-acetaminophen 5-325 MG per tablet                Protect others around you: Learn how to safely use, store and throw away your medicines at www.disposemymeds.org.        Information about OPIOIDS     PRESCRIPTION OPIOIDS: WHAT YOU NEED TO KNOW   We gave you an opioid (narcotic) pain medicine. It is important to manage your pain, but opioids are not always the best choice. You should first try all the other options your care team gave you. Take this medicine for as short a time (and as few doses) as possible.    Some activities can increase your pain, such as bandage changes or therapy sessions. It may help to take your pain medicine 30 to 60 minutes before these activities. Reduce your stress by getting enough sleep, working on hobbies you enjoy and practicing relaxation or meditation. Talk to your care team about ways to manage your pain beyond prescription opioids.    These medicines have risks:    DO NOT drive when on new or higher doses of pain medicine. These medicines can affect your alertness and reaction times, and you could  be arrested for driving under the influence (DUI). If you need to use opioids long-term, talk to your care team about driving.    DO NOT operate heavy machinery    DO NOT do any other dangerous activities while taking these medicines.    DO NOT drink any alcohol while taking these medicines.     If the opioid prescribed includes acetaminophen, DO NOT take with any other medicines that contain acetaminophen. Read all labels carefully. Look for the word  acetaminophen  or  Tylenol.  Ask your pharmacist if you have questions or are unsure.    You can get addicted to pain medicines, especially if you have a history of addiction (chemical, alcohol or substance dependence). Talk to your care team about ways to reduce this risk.    All opioids tend to cause constipation. Drink plenty of water and eat foods that have a lot of fiber, such as fruits, vegetables, prune juice, apple juice and high-fiber cereal. Take a laxative (Miralax, milk of magnesia, Colace, Senna) if you don t move your bowels at least every other day. Other side effects include upset stomach, sleepiness, dizziness, throwing up, tolerance (needing more of the medicine to have the same effect), physical dependence and slowed breathing.    Store your pills in a secure place, locked if possible. We will not replace any lost or stolen medicine. If you don t finish your medicine, please throw away (dispose) as directed by your pharmacist. The [place] Pollution Control Agency has more information about safe disposal: https://www.pca.[facility].[place].us/living-green/managing-unwanted-medications             Medication List: This is a list of all your medications and when to take them. Check marks below indicate your daily home schedule. Keep this list as a reference.      Medications           Morning Afternoon Evening Bedtime As Needed    ADVIL PO   Take 400 mg by mouth daily as needed for moderate pain (200mg x 2 = 400mg)                                BIOTIN PO    Take 1,000 mcg by mouth three times a week                                cetirizine 10 MG tablet   Commonly known as:  zyrTEC   Take 10 mg by mouth daily as needed for allergies                                CHELATED MAGNESIUM PO   Take 1 tablet by mouth three times a week                                Iron 28 MG Tabs   Take 28 mg by mouth three times a week                                ONE-A-DAY WOMENS FORMULA PO   Take 1 tablet by mouth three times a week                                oxyCODONE-acetaminophen 5-325 MG per tablet   Commonly known as:  PERCOCET   Take 1-2 tablets by mouth every 4 hours as needed for pain (moderate to severe)   Last time this was given:  1 tablet on 10/26/2018  4:03 AM                                TYLENOL PO   Take 320 mg by mouth 2 times daily as needed for mild pain                                VITAMIN B6 PO   Take 100 mg by mouth three times a week                                VITAMIN D (CHOLECALCIFEROL) PO   Take 5,000 Units by mouth three times a week

## 2018-10-25 NOTE — ANESTHESIA POSTPROCEDURE EVALUATION
Patient: Hailey Garibay    Procedure(s):  LAPAROSCOPY WITH CO2 LASER VAPORIZATION OF ENDOMETRIOSIS  LAPAROSCOPIC SUPRACERVICAL HYSTERECTOMY, WITH BILATERAL SALPINGECTOMY  CYSTOSCOPY, ANTERIOR REPAIR    Diagnosis:multiple myomas and right ovarian endometrioma  Diagnosis Additional Information: No value filed.    Anesthesia Type:  General, ETT    Note:  Anesthesia Post Evaluation    Patient location during evaluation: PACU  Patient participation: Able to fully participate in evaluation  Level of consciousness: awake  Pain management: adequate  Airway patency: patent  Cardiovascular status: acceptable  Respiratory status: acceptable  Hydration status: acceptable  PONV: none     Anesthetic complications: None          Last vitals:  Vitals:    10/25/18 1047 10/25/18 1100 10/25/18 1115   BP: 120/70 113/70 115/67   Pulse:      Resp: 17     Temp: 36.8  C (98.2  F)     SpO2: 95%  97%         Electronically Signed By: Maura Goodwin MD, MD  October 25, 2018  2:33 PM

## 2018-10-25 NOTE — BRIEF OP NOTE
OP NOTE:    Laparoscopy, Laser lysis adhesions, laser vaporization of stage 2 endometriosis, lap assisted supracervical hysterectomy, bilateral salpingectomy, anterior repair, cystoscopy    ebl 25 ml    Augustin   No comp

## 2018-10-25 NOTE — IP AVS SNAPSHOT
[facility]    [address] [place] [address]    Phone:  [phone]                                       After Visit Summary   10/25/2018    Hailey Garibay    MRN: [id number]           After Visit Summary Signature Page     I have received my discharge instructions, and my questions have been answered. I have discussed any challenges I see with this plan with the nurse or doctor.    ..........................................................................................................................................  Patient/Patient Representative Signature      ..........................................................................................................................................  Patient Representative Print Name and Relationship to Patient    ..................................................               ................................................  Date                                   Time    ..........................................................................................................................................  Reviewed by Signature/Title    ...................................................              ..............................................  Date                                               Time          22EPIC Rev 08/18

## 2018-10-25 NOTE — ANESTHESIA CARE TRANSFER NOTE
Patient: Hailey Garibay    Procedure(s):  LAPAROSCOPY WITH CO2 LASER VAPORIZATION OF ENDOMETRIOSIS  LAPAROSCOPIC SUPRACERVICAL HYSTERECTOMY, WITH BILATERAL SALPINGECTOMY  CYSTOSCOPY, ANTERIOR REPAIR    Diagnosis: multiple myomas and right ovarian endometrioma  Diagnosis Additional Information: No value filed.    Anesthesia Type:   General, ETT     Note:  Airway :Face Mask  Patient transferred to:PACU  Comments: Vitals stable.      Vitals: (Last set prior to Anesthesia Care Transfer)    CRNA VITALS  10/25/2018 0826 - 10/25/2018 0904      10/25/2018             Resp Rate (set): 10                Electronically Signed By: FELICIA Johnson CRNA  October 25, 2018  9:04 AM

## 2018-10-25 NOTE — PROGRESS NOTES
Admission medication history interview status for the 10/25/2018  admission is complete. See EPIC admission navigator for prior to admission medications     Medication history source reliability:Good    Medication history interview source(s):Patient    Medication history resources (including written lists, pill bottles, clinic record):mailed in list    [facility]    Additional medication history information not noted on PTA med list :None    Time spent in this activity: 40 minutes    Prior to Admission medications    Medication Sig Last Dose Taking? Auth Provider   Acetaminophen (TYLENOL PO) Take 320 mg by mouth 2 times daily as needed for mild pain 10/19/2018 at prn Yes Reported, Patient   BIOTIN PO Take 1,000 mcg by mouth three times a week more than 3 weeks at noon Yes Reported, Patient   cetirizine (ZYRTEC) 10 MG tablet Take 10 mg by mouth daily as needed for allergies 10/23/2018 at prn Yes Reported, Patient   CHELATED MAGNESIUM PO Take 1 tablet by mouth three times a week more than 3 weeks at noon Yes Reported, Patient   Ferrous Sulfate (IRON) 28 MG TABS Take 28 mg by mouth three times a week more than 3 weeks at noon Yes Reported, Patient   Ibuprofen (ADVIL PO) Take 400 mg by mouth daily as needed for moderate pain (200mg x 2 = 400mg) more than 2 weeks at prn Yes Reported, Patient   Multiple Vitamins-Calcium (ONE-A-DAY WOMENS FORMULA PO) Take 1 tablet by mouth three times a week  more than 3 weeks at noon Yes Reported, Patient   Pyridoxine HCl (VITAMIN B6 PO) Take 100 mg by mouth three times a week  more than 3 weeks at noon Yes Reported, Patient   VITAMIN D, CHOLECALCIFEROL, PO Take 5,000 Units by mouth three times a week more than 3 weeks at noon Yes Reported, Patient

## 2018-10-26 VITALS
DIASTOLIC BLOOD PRESSURE: 69 MMHG | TEMPERATURE: 98.7 F | WEIGHT: 184.2 LBS | OXYGEN SATURATION: 98 % | HEART RATE: 75 BPM | BODY MASS INDEX: 27.28 KG/M2 | RESPIRATION RATE: 16 BRPM | HEIGHT: 69 IN | SYSTOLIC BLOOD PRESSURE: 128 MMHG

## 2018-10-26 LAB
COPATH REPORT: NORMAL
GLUCOSE BLDC GLUCOMTR-MCNC: 93 MG/DL (ref 70–99)
HGB BLD-MCNC: 10.2 G/DL (ref 11.7–15.7)

## 2018-10-26 PROCEDURE — 36415 COLL VENOUS BLD VENIPUNCTURE: CPT | Performed by: OBSTETRICS & GYNECOLOGY

## 2018-10-26 PROCEDURE — 82962 GLUCOSE BLOOD TEST: CPT

## 2018-10-26 PROCEDURE — 40000934 ZZH STATISTIC OUTPATIENT (NON-OBS) DAY

## 2018-10-26 PROCEDURE — 25000132 ZZH RX MED GY IP 250 OP 250 PS 637: Performed by: OBSTETRICS & GYNECOLOGY

## 2018-10-26 PROCEDURE — 25000128 H RX IP 250 OP 636: Performed by: OBSTETRICS & GYNECOLOGY

## 2018-10-26 PROCEDURE — 85018 HEMOGLOBIN: CPT | Performed by: OBSTETRICS & GYNECOLOGY

## 2018-10-26 RX ADMIN — OXYCODONE HYDROCHLORIDE AND ACETAMINOPHEN 1 TABLET: 5; 325 TABLET ORAL at 04:03

## 2018-10-26 RX ADMIN — OXYCODONE HYDROCHLORIDE AND ACETAMINOPHEN 1 TABLET: 5; 325 TABLET ORAL at 11:15

## 2018-10-26 RX ADMIN — KETOROLAC TROMETHAMINE 30 MG: 30 INJECTION, SOLUTION INTRAMUSCULAR at 07:42

## 2018-10-26 RX ADMIN — CEFAZOLIN SODIUM 2 G: 2 INJECTION, SOLUTION INTRAVENOUS at 01:31

## 2018-10-26 NOTE — PROGRESS NOTES
Gyn 1  Pt doing well. Flatus po. Incisions fine. Cath out and trial voiding this am. Home today, rtc 2 weeks

## 2018-11-06 ENCOUNTER — OFFICE VISIT (OUTPATIENT)
Dept: OBGYN | Facility: CLINIC | Age: 48
End: 2018-11-06
Payer: COMMERCIAL

## 2018-11-06 VITALS
BODY MASS INDEX: 27.05 KG/M2 | SYSTOLIC BLOOD PRESSURE: 100 MMHG | HEART RATE: 80 BPM | DIASTOLIC BLOOD PRESSURE: 80 MMHG | WEIGHT: 183.2 LBS

## 2018-11-06 DIAGNOSIS — Z09 POSTOP CHECK: Primary | ICD-10-CM

## 2018-11-06 DIAGNOSIS — Z48.816 AFTERCARE FOLLOWING SURGERY OF THE GENITOURINARY SYSTEM: Primary | ICD-10-CM

## 2018-11-06 LAB — HGB BLD-MCNC: 11.8 G/DL (ref 11.7–15.7)

## 2018-11-06 PROCEDURE — 85018 HEMOGLOBIN: CPT | Performed by: OBSTETRICS & GYNECOLOGY

## 2018-11-06 PROCEDURE — 99024 POSTOP FOLLOW-UP VISIT: CPT | Performed by: OBSTETRICS & GYNECOLOGY

## 2018-11-06 PROCEDURE — 36415 COLL VENOUS BLD VENIPUNCTURE: CPT | Performed by: OBSTETRICS & GYNECOLOGY

## 2018-11-06 NOTE — MR AVS SNAPSHOT
After Visit Summary   11/6/2018    Hailey Garibay    MRN: [id number]           Patient Information     Date Of Birth          1970        Visit Information        Provider Department      11/6/2018 4:15 PM Yadiel Baer MD [facility] [place]        Today's Diagnoses     Postop check    -  1       Follow-ups after your visit        Your next 10 appointments already scheduled     Jeremy 15, 2019  2:15 PM CST   SHORT with Yadiel Baer MD   [facility] [place] ([facility] [place])    [address] [place] [id number] [phone]              Who to contact     If you have questions or need follow up information about today's clinic visit or your schedule please contact [facility] directly at [phone].  Normal or non-critical lab and imaging results will be communicated to you by MyActivityPalhart, letter or phone within 4 business days after the clinic has received the results. If you do not hear from us within 7 days, please contact the clinic through MyChart or phone. If you have a critical or abnormal lab result, we will notify you by phone as soon as possible.  Submit refill requests through [employer] or call your pharmacy and they will forward the refill request to us. Please allow 3 business days for your refill to be completed.          Additional Information About Your Visit        MyChart Information     [employer] gives you secure access to your electronic health record. If you see a primary care provider, you can also send messages to your care team and make appointments. If you have questions, please call your primary care clinic.  If you do not have a primary care provider, please call [phone] and they will assist you.        Care EveryWhere ID     This is your Care EveryWhere ID. This could be used by other organizations to access your [place] medical records  [id number]        Your Vitals Were     Pulse  Last Period Breastfeeding? BMI (Body Mass Index)          80 10/10/2018 No 27.05 kg/m2         Blood Pressure from Last 3 Encounters:   11/06/18 100/80   10/26/18 128/69   10/22/18 110/76    Weight from Last 3 Encounters:   11/06/18 183 lb 3.2 oz (83.1 kg)   10/25/18 184 lb 3.2 oz (83.6 kg)   10/22/18 185 lb (83.9 kg)              Today, you had the following     No orders found for display       Primary Care Provider Office Phone # Fax #    [facility] [phone]       [facility] [address] [facility] [address]  [facility] [id number]        Equal Access to Services     ROB MILLER : Hadii boogie marsho Sojazmyn, waaxda luqadaha, qaybta kaalmada adeegyada, dayday lamb . So [facility] [phone].    ATENCIÓN: Si habla español, tiene a griffin disposición servicios gratuitos de asistencia lingüística. Llame al [phone].    We comply with applicable federal civil rights laws and [place] laws. We do not discriminate on the basis of race, color, national origin, age, disability, sex, sexual orientation, or gender identity.            Thank you!     Thank you for choosing [facility]  for your care. Our goal is always to provide you with excellent care. Hearing back from our patients is one way we can continue to improve our services. Please take a few minutes to complete the written survey that you may receive in the mail after your visit with us. Thank you!             Your Updated Medication List - Protect others around you: Learn how to safely use, store and throw away your medicines at www.disposemymeds.org.          This list is accurate as of 11/6/18  4:27 PM.  Always use your most recent med list.                   Brand Name Dispense Instructions for use Diagnosis    ADVIL PO      Take 400 mg by mouth daily as needed for moderate pain (200mg x 2 = 400mg)        BIOTIN PO      Take 1,000 mcg by mouth three  times a week        cetirizine 10 MG tablet    zyrTEC     Take 10 mg by mouth daily as needed for allergies        CHELATED MAGNESIUM PO      Take 1 tablet by mouth three times a week        Iron 28 MG Tabs      Take 28 mg by mouth three times a week        ONE-A-DAY WOMENS FORMULA PO      Take 1 tablet by mouth three times a week        oxyCODONE-acetaminophen 5-325 MG per tablet    PERCOCET    36 tablet    Take 1-2 tablets by mouth every 4 hours as needed for pain (moderate to severe)    Myoma       TYLENOL PO      Take 320 mg by mouth 2 times daily as needed for mild pain        VITAMIN B6 PO      Take 100 mg by mouth three times a week        VITAMIN D (CHOLECALCIFEROL) PO      Take 5,000 Units by mouth three times a week

## 2018-11-06 NOTE — PROGRESS NOTES
The patient is seen for her postop check after a supracervical hysterectomy.  Her pathology was benign.  Her incisions are healing very well.  She will return in 2 months.

## 2018-12-10 ENCOUNTER — HOSPITAL ENCOUNTER (OUTPATIENT)
Dept: MAMMOGRAPHY | Facility: CLINIC | Age: 48
Discharge: HOME | End: 2018-12-10
Attending: OBSTETRICS & GYNECOLOGY | Admitting: OBSTETRICS & GYNECOLOGY
Payer: COMMERCIAL

## 2018-12-10 DIAGNOSIS — Z12.31 VISIT FOR SCREENING MAMMOGRAM: ICD-10-CM

## 2018-12-10 PROCEDURE — 77067 SCR MAMMO BI INCL CAD: CPT

## 2019-01-22 NOTE — PROGRESS NOTES
SUBJECTIVE:                                                   Hailey Garibay is a 49 year old female who presents to clinic today for the following health issue(s):  Patient presents with:  Surgical Followup: 10/25/2018 Laparoscopy W/C02 Laser      HPI: The patient is seen at this time for follow-up of pelvic pain with a laser laparoscopy and urinary stress incontinence with hysterectomy and anterior colporrhaphy all performed in October 2018.  Patient has no current pain issues.  She is able to do aerobics with no leakage of urine at all.      No LMP recorded. Patient is perimenopausal..   Patient is sexually active, No obstetric history on file..  Using none for contraception.    reports that  has never smoked. she has never used smokeless tobacco.    STD testing offered?  Declined    Health maintenance updated:  yes    Today's PHQ-2 Score: No flowsheet data found.  Today's PHQ-9 Score:   PHQ-9 SCORE 9/10/2018   PHQ-9 Total Score 0     Today's ZUHAIR-7 Score:   ZUHAIR-7 SCORE 9/10/2018   Total Score 0       Problem list and histories reviewed & adjusted, as indicated.  Additional history: as documented.    Patient Active Problem List   Diagnosis     Myoma     Past Surgical History:   Procedure Laterality Date     COLPORRHAPY ANTERIOR, CYSTOSCOPY, COMBINED N/A 10/25/2018    Procedure: CYSTOSCOPY, ANTERIOR REPAIR;  Surgeon: Yadiel Baer MD;  Location: [facility]     EYE SURGERY       LAPAROSCOPIC HYSTERECTOMY SUPRACERVICAL N/A 10/25/2018    Procedure: LAPAROSCOPIC SUPRACERVICAL HYSTERECTOMY, WITH BILATERAL SALPINGECTOMY;  Surgeon: Yadiel Baer MD;  Location: [facility]     LASER CO2 LAPAROSCOPIC VAPORIZATION ENDOMETRIUM N/A 10/25/2018    Procedure: LAPAROSCOPY WITH CO2 LASER VAPORIZATION OF ENDOMETRIOSIS;  Surgeon: Yadiel Baer MD;  Location: [facility]     MAMMOPLASTY AUGMENTATION BILATERAL  2002      Social History     Tobacco Use     Smoking status: Never Smoker     Smokeless tobacco: Never Used   Substance Use  Topics     Alcohol use: Yes     Comment: 2/week         Negative family history of: Family History Negative            Current Outpatient Medications   Medication Sig     Acetaminophen (TYLENOL PO) Take 320 mg by mouth 2 times daily as needed for mild pain     BIOTIN PO Take 1,000 mcg by mouth three times a week     cetirizine (ZYRTEC) 10 MG tablet Take 10 mg by mouth daily as needed for allergies     CHELATED MAGNESIUM PO Take 1 tablet by mouth three times a week     Ferrous Sulfate (IRON) 28 MG TABS Take 28 mg by mouth three times a week     Ibuprofen (ADVIL PO) Take 400 mg by mouth daily as needed for moderate pain (200mg x 2 = 400mg)     Multiple Vitamins-Calcium (ONE-A-DAY WOMENS FORMULA PO) Take 1 tablet by mouth three times a week      Pyridoxine HCl (VITAMIN B6 PO) Take 100 mg by mouth three times a week      VITAMIN D, CHOLECALCIFEROL, PO Take 5,000 Units by mouth three times a week     No current facility-administered medications for this visit.      No Known Allergies    ROS:  12 point review of systems negative other than symptoms noted below.    OBJECTIVE:     /68   Wt 85.6 kg (188 lb 12.8 oz)   Breastfeeding? No   BMI 27.88 kg/m    Body mass index is 27.88 kg/m .    Exam:  Constitutional:  Appearance: Well nourished, well developed alert, in no acute distress  Gastrointestinal:  Abdominal Examination:  Abdomen nontender to palpation, tone normal without rigidity or guarding, no masses present, umbilicus without lesions; Liver/Spleen:  No hepatomegaly present, liver nontender to palpation; Hernias:  No hernias present  Lymphatic: Lymph Nodes:  No other lymphadenopathy present  Skin:General Inspection:  No rashes present, no lesions present, no areas of discoloration; Genitalia and Groin:  No rashes present, no lesions present, no areas of discoloration, no masses present.  Neurologic/Psychiatric:  Mental Status:  Oriented X3   Pelvic Exam:  External Genitalia:     Normal appearance for age, no  discharge present, no tenderness present, no inflammatory lesions present, color normal  Vagina:     Normal vaginal vault without central or paravaginal defects, no discharge present, no inflammatory lesions present, no masses present  Bladder:     Nontender to palpation  Urethra:   Urethral Body:  Urethra palpation normal, urethra structural support normal   Urethral Meatus:  No erythema or lesions present  Cervix:     Appearance healthy, no lesions present, nontender to palpation, no bleeding present  Uterus:     Surgically absent  Adnexa:     No adnexal tenderness present, no adnexal masses present  Perineum:     Perineum within normal limits, no evidence of trauma, no rashes or skin lesions present  Anus:     Anus within normal limits, no hemorrhoids present  Inguinal Lymph Nodes:     No lymphadenopathy present  Pubic Hair:     Normal pubic hair distribution for age  Genitalia and Groin:     No rashes present, no lesions present, no areas of discoloration, no masses present       In-Clinic Test Results:      ASSESSMENT/PLAN:                                                        The patient is seen in follow-up of urinary stress incontinence and a hysterectomy for myomatous uterus.  She has a history of widespread endometriosis but has no pelvic pain at this time.  Her examination shows excellent support anteriorly and apically.  She will return for her Pap smears and mammograms for yearly exam.        Yadiel Baer MD  [facility] [place]

## 2019-01-23 ENCOUNTER — OFFICE VISIT (OUTPATIENT)
Dept: OBGYN | Facility: CLINIC | Age: 49
End: 2019-01-23
Payer: COMMERCIAL

## 2019-01-23 VITALS — DIASTOLIC BLOOD PRESSURE: 68 MMHG | SYSTOLIC BLOOD PRESSURE: 116 MMHG | WEIGHT: 188.8 LBS | BODY MASS INDEX: 27.88 KG/M2

## 2019-01-23 DIAGNOSIS — N39.3 URINARY, INCONTINENCE, STRESS FEMALE: Primary | ICD-10-CM

## 2019-01-23 PROCEDURE — 99024 POSTOP FOLLOW-UP VISIT: CPT | Performed by: OBSTETRICS & GYNECOLOGY

## 2019-11-05 ENCOUNTER — HEALTH MAINTENANCE LETTER (OUTPATIENT)
Age: 49
End: 2019-11-05

## 2019-12-05 ENCOUNTER — HOSPITAL ENCOUNTER (OUTPATIENT)
Dept: MAMMOGRAPHY | Facility: CLINIC | Age: 49
Discharge: HOME | End: 2019-12-05
Attending: OBSTETRICS & GYNECOLOGY | Admitting: OBSTETRICS & GYNECOLOGY
Payer: COMMERCIAL

## 2019-12-05 DIAGNOSIS — Z12.31 VISIT FOR SCREENING MAMMOGRAM: ICD-10-CM

## 2019-12-05 PROCEDURE — 77067 SCR MAMMO BI INCL CAD: CPT

## 2020-11-22 ENCOUNTER — HEALTH MAINTENANCE LETTER (OUTPATIENT)
Age: 50
End: 2020-11-22

## 2021-02-13 ENCOUNTER — HEALTH MAINTENANCE LETTER (OUTPATIENT)
Age: 51
End: 2021-02-13

## 2021-09-19 ENCOUNTER — HEALTH MAINTENANCE LETTER (OUTPATIENT)
Age: 51
End: 2021-09-19

## 2022-01-09 ENCOUNTER — HEALTH MAINTENANCE LETTER (OUTPATIENT)
Age: 52
End: 2022-01-09

## 2022-03-06 ENCOUNTER — HEALTH MAINTENANCE LETTER (OUTPATIENT)
Age: 52
End: 2022-03-06

## 2022-11-21 ENCOUNTER — HEALTH MAINTENANCE LETTER (OUTPATIENT)
Age: 52
End: 2022-11-21

## 2023-04-16 ENCOUNTER — HEALTH MAINTENANCE LETTER (OUTPATIENT)
Age: 53
End: 2023-04-16

## 2023-05-04 NOTE — PLAN OF CARE
Problem: Patient Care Overview  Goal: Plan of Care/Patient Progress Review  Outcome: Improving  A&O x4, VSS on RA. Capno in place 39/10. Arrived at 11 am to unit, sleepy. Pain 2/10, ice pack in place. No c/o nausea, scopolamine patch in place.  3 Port sites intact with steri strips, c/d/i. Scant spotting. CMS intact. IV fluids @ 100 ml/hr. LS clear, using IS. BS audible faint, not passing flatus. Augustin catheter in place, intact. Pt still needs to dangle. Will continue to monitor.      
Problem: Patient Care Overview  Goal: Plan of Care/Patient Progress Review  Outcome: Improving  A&O, VSS, dangled and stood up with assist of 1, percocet and toradol for pain, warm pack to shoulder for gas pain, ice applied, passing flatus, scant vaginal bleeding, abd steri strips intact WDL, parson removed at 0600, due to void. IS 1500 done independently. Refused capno, placed on continuous pulse oximetry. Tolerating full liquid diet. Will continue to monitor.      
Problem: Patient Care Overview  Goal: Plan of Care/Patient Progress Review  Outcome: Improving  A/O, VSS, dangled, percocet and toradol for pain, warm pack to shoulder for gas pain, ice applied, hypoactive BS, no flatus, scant vaginal bleeding, abd steri strips intact WDL, parson WDL, IS 1500 done independently. Advanced to full liquid diet.      
No

## 2024-04-14 NOTE — ANESTHESIA PREPROCEDURE EVALUATION
Anesthesia Evaluation     . Pt has had prior anesthetic.     No history of anesthetic complications          ROS/MED HX    ENT/Pulmonary:      (-) sleep apnea   Neurologic:       Cardiovascular:         METS/Exercise Tolerance:     Hematologic:         Musculoskeletal:         GI/Hepatic:        (-) GERD   Renal/Genitourinary: Comment: Myomas and endometrioma        Endo:         Psychiatric:         Infectious Disease:         Malignancy:         Other:                                    Anesthesia Plan      History & Physical Review  History and physical reviewed and following examination; no interval change.    ASA Status:  1 .    NPO Status:  > 8 hours    Plan for General and ETT with Intravenous induction. Maintenance will be Balanced.    PONV prophylaxis:  Ondansetron (or other 5HT-3) and Dexamethasone or Solumedrol  Propofol gtt  Tylenol and atarax pre-op      Postoperative Care  Postoperative pain management:  IV analgesics and Oral pain medications.      Consents  Anesthetic plan, risks, benefits and alternatives discussed with:  Patient..                          .   None

## (undated) DEVICE — CATH TRAY FOLEY 16FR BARDEX W/DRAIN BAG STATLOCK 300316A

## (undated) DEVICE — NDL 22GA 1.5"

## (undated) DEVICE — WIPES FOLEY CARE SURESTEP PROVON DFC100

## (undated) DEVICE — SOL RINGERS LACTATED 1000ML BAG 2B2324X

## (undated) DEVICE — SOL WATER IRRIG 1000ML BOTTLE 2F7114

## (undated) DEVICE — SYSTEM CLEARIFY VISUALIZATION 21-345

## (undated) DEVICE — TUBING SUCTION 12"X1/4" N612

## (undated) DEVICE — ENDO TROCAR FIRST ENTRY KII FIOS Z-THRD 11X100MM CTF33

## (undated) DEVICE — TUBING IRR TUR Y TYPE 2C4041

## (undated) DEVICE — SU VICRYL 2-0 CT-2 27" J333H

## (undated) DEVICE — EVAC SYSTEM CLEAR FLOW SC082500

## (undated) DEVICE — SU CHROMIC 2-0 CT-2 27" 883H

## (undated) DEVICE — SOL WATER IRRIG 3000ML BAG 2B7117

## (undated) DEVICE — BLADE CLIPPER 4406

## (undated) DEVICE — BLADE KNIFE SURG 15 371115

## (undated) DEVICE — Device

## (undated) DEVICE — TUBING HYDRO-SURG PLUS LAP IRRIGATOR SUCTION 0026870

## (undated) DEVICE — SUCTION CANISTER MEDIVAC LINER 3000ML W/LID 65651-530

## (undated) DEVICE — ESU HOLDER LAP INST DISP PURPLE LONG 330MM H-PRO-330

## (undated) DEVICE — NDL 19GA 1.5"

## (undated) DEVICE — SU VICRYL 2-0 TIE 12X18" J905T

## (undated) DEVICE — ESU FCP OLYMPUS PK CUTTING 5MMX33CM PK-CF0533

## (undated) DEVICE — ESU ELEC BLADE 6" COATED E1450-6

## (undated) DEVICE — NDL INSUFFLATION 13GA 120MM C2201

## (undated) DEVICE — SU VICRYL 2-0 UR-6 27" J602H

## (undated) DEVICE — ENDO APPLICATOR SURGIFLO PLASMA COBLATION MS1995

## (undated) DEVICE — MORCELLATOR LAPAROSCOPIC LINA XCISE MOR-1515-6

## (undated) DEVICE — ESU PENCIL W/HOLSTER E2350H

## (undated) DEVICE — NDL COUNTER 20CT 31142493

## (undated) DEVICE — TUBING IRRIG TUR Y TYPE 96" LF 6543-01

## (undated) DEVICE — SU VICRYL 4-0 PS-2 18" UND J496H

## (undated) DEVICE — SYR 10ML SLIP TIP W/O NDL

## (undated) DEVICE — LINEN TOWEL PACK X5 5464

## (undated) DEVICE — GLOVE PROTEXIS MICRO 7.5  2D73PM75

## (undated) DEVICE — GLOVE PROTEXIS W/NEU-THERA 8.0  2D73TE80

## (undated) DEVICE — CATH TRAY FOLEY SURESTEP 16FR WDRAIN BAG STLK LATEX A300316A

## (undated) DEVICE — SOL WATER 3000ML BAG 7973-08

## (undated) DEVICE — SUCTION TIP YANKAUER W/O VENT K86

## (undated) DEVICE — DEVICE SUTURE GRASPER TROCAR CLOSURE 14GA PMITCSG

## (undated) DEVICE — SYR 05ML SLIP TIP W/O NDL 309647

## (undated) DEVICE — ESU HANDPIECE OLYMPUS PK SPATULA PK-SP0533

## (undated) DEVICE — DRSG STERI STRIP 1/2X4" R1547

## (undated) RX ORDER — ONDANSETRON 2 MG/ML
INJECTION INTRAMUSCULAR; INTRAVENOUS
Status: DISPENSED
Start: 2018-10-25

## (undated) RX ORDER — HYDROXYZINE HYDROCHLORIDE 50 MG/1
TABLET, FILM COATED ORAL
Status: DISPENSED
Start: 2018-10-25

## (undated) RX ORDER — GLYCOPYRROLATE 0.2 MG/ML
INJECTION, SOLUTION INTRAMUSCULAR; INTRAVENOUS
Status: DISPENSED
Start: 2018-10-25

## (undated) RX ORDER — KETOROLAC TROMETHAMINE 30 MG/ML
INJECTION, SOLUTION INTRAMUSCULAR; INTRAVENOUS
Status: DISPENSED
Start: 2018-10-25

## (undated) RX ORDER — LIDOCAINE HYDROCHLORIDE 20 MG/ML
INJECTION, SOLUTION EPIDURAL; INFILTRATION; INTRACAUDAL; PERINEURAL
Status: DISPENSED
Start: 2018-10-25

## (undated) RX ORDER — FENTANYL CITRATE 50 UG/ML
INJECTION, SOLUTION INTRAMUSCULAR; INTRAVENOUS
Status: DISPENSED
Start: 2018-10-25

## (undated) RX ORDER — DEXAMETHASONE SODIUM PHOSPHATE 4 MG/ML
INJECTION, SOLUTION INTRA-ARTICULAR; INTRALESIONAL; INTRAMUSCULAR; INTRAVENOUS; SOFT TISSUE
Status: DISPENSED
Start: 2018-10-25

## (undated) RX ORDER — ACETAMINOPHEN 500 MG
TABLET ORAL
Status: DISPENSED
Start: 2018-10-25

## (undated) RX ORDER — PROPOFOL 10 MG/ML
INJECTION, EMULSION INTRAVENOUS
Status: DISPENSED
Start: 2018-10-25

## (undated) RX ORDER — SCOLOPAMINE TRANSDERMAL SYSTEM 1 MG/1
PATCH, EXTENDED RELEASE TRANSDERMAL
Status: DISPENSED
Start: 2018-10-25

## (undated) RX ORDER — MEPERIDINE HYDROCHLORIDE 25 MG/ML
INJECTION INTRAMUSCULAR; INTRAVENOUS; SUBCUTANEOUS
Status: DISPENSED
Start: 2018-10-25

## (undated) RX ORDER — HYDROMORPHONE HYDROCHLORIDE 1 MG/ML
INJECTION, SOLUTION INTRAMUSCULAR; INTRAVENOUS; SUBCUTANEOUS
Status: DISPENSED
Start: 2018-10-25

## (undated) RX ORDER — NEOSTIGMINE METHYLSULFATE 1 MG/ML
VIAL (ML) INJECTION
Status: DISPENSED
Start: 2018-10-25

## (undated) RX ORDER — CEFAZOLIN SODIUM 2 G/100ML
INJECTION, SOLUTION INTRAVENOUS
Status: DISPENSED
Start: 2018-10-25